# Patient Record
Sex: FEMALE | Race: WHITE | Employment: FULL TIME | URBAN - METROPOLITAN AREA
[De-identification: names, ages, dates, MRNs, and addresses within clinical notes are randomized per-mention and may not be internally consistent; named-entity substitution may affect disease eponyms.]

---

## 2020-01-09 LAB
ABO, EXTERNAL RESULT: NORMAL
HEP B, EXTERNAL RESULT: NEGATIVE
HIV, EXTERNAL RESULT: NON REACTIVE
RH FACTOR, EXTERNAL RESULT: POSITIVE
RPR, EXTERNAL RESULT: NON REACTIVE
RUBELLA TITER, EXTERNAL RESULT: NORMAL

## 2020-02-08 LAB
C. TRACHOMATIS, EXTERNAL RESULT: NEGATIVE
N. GONORRHOEAE, EXTERNAL RESULT: NEGATIVE

## 2020-07-24 LAB — GBS, EXTERNAL RESULT: NEGATIVE

## 2020-08-06 ENCOUNTER — ANESTHESIA EVENT (OUTPATIENT)
Dept: LABOR AND DELIVERY | Age: 25
End: 2020-08-06
Payer: COMMERCIAL

## 2020-08-06 ENCOUNTER — ANESTHESIA (OUTPATIENT)
Dept: LABOR AND DELIVERY | Age: 25
End: 2020-08-06
Payer: COMMERCIAL

## 2020-08-06 ENCOUNTER — HOSPITAL ENCOUNTER (INPATIENT)
Age: 25
LOS: 2 days | Discharge: HOME | End: 2020-08-08
Attending: OBSTETRICS & GYNECOLOGY | Admitting: OBSTETRICS & GYNECOLOGY
Payer: COMMERCIAL

## 2020-08-06 VITALS — SYSTOLIC BLOOD PRESSURE: 112 MMHG | DIASTOLIC BLOOD PRESSURE: 76 MMHG | OXYGEN SATURATION: 100 %

## 2020-08-06 PROBLEM — O99.213 OBESITY AFFECTING PREGNANCY IN THIRD TRIMESTER: Status: ACTIVE | Noted: 2020-08-06

## 2020-08-06 PROBLEM — Z98.891 S/P CESAREAN SECTION: Status: ACTIVE | Noted: 2020-08-06

## 2020-08-06 PROBLEM — O12.13: Status: ACTIVE | Noted: 2020-08-06

## 2020-08-06 LAB
ABO/RH: NORMAL
AMPHETAMINE SCREEN, URINE: NORMAL
ANTIBODY SCREEN: NORMAL
BARBITURATE SCREEN URINE: NORMAL
BENZODIAZEPINE SCREEN, URINE: NORMAL
BUPRENORPHINE URINE: NORMAL
CANNABINOID SCREEN URINE: NORMAL
COCAINE METABOLITE SCREEN URINE: NORMAL
HCT VFR BLD CALC: 35.2 % (ref 36–48)
HEMOGLOBIN: 11.2 G/DL (ref 12–16)
Lab: NORMAL
MCH RBC QN AUTO: 22.5 PG (ref 26–34)
MCHC RBC AUTO-ENTMCNC: 31.7 G/DL (ref 31–36)
MCV RBC AUTO: 71 FL (ref 80–100)
METHADONE SCREEN, URINE: NORMAL
OPIATE SCREEN URINE: NORMAL
OXYCODONE URINE: NORMAL
PDW BLD-RTO: 16.5 % (ref 12.4–15.4)
PH UA: 6
PHENCYCLIDINE SCREEN URINE: NORMAL
PLATELET # BLD: 316 K/UL (ref 135–450)
PMV BLD AUTO: 8.6 FL (ref 5–10.5)
PROPOXYPHENE SCREEN: NORMAL
RBC # BLD: 4.96 M/UL (ref 4–5.2)
SARS-COV-2, NAAT: NOT DETECTED
TOTAL SYPHILLIS IGG/IGM: NORMAL
WBC # BLD: 12 K/UL (ref 4–11)

## 2020-08-06 PROCEDURE — 2500000003 HC RX 250 WO HCPCS: Performed by: NURSE ANESTHETIST, CERTIFIED REGISTERED

## 2020-08-06 PROCEDURE — 3609079900 HC CESAREAN SECTION: Performed by: OBSTETRICS & GYNECOLOGY

## 2020-08-06 PROCEDURE — U0002 COVID-19 LAB TEST NON-CDC: HCPCS

## 2020-08-06 PROCEDURE — 6370000000 HC RX 637 (ALT 250 FOR IP): Performed by: OBSTETRICS & GYNECOLOGY

## 2020-08-06 PROCEDURE — 2709999900 HC NON-CHARGEABLE SUPPLY: Performed by: OBSTETRICS & GYNECOLOGY

## 2020-08-06 PROCEDURE — 86780 TREPONEMA PALLIDUM: CPT

## 2020-08-06 PROCEDURE — 2580000003 HC RX 258: Performed by: OBSTETRICS & GYNECOLOGY

## 2020-08-06 PROCEDURE — 86850 RBC ANTIBODY SCREEN: CPT

## 2020-08-06 PROCEDURE — 6360000002 HC RX W HCPCS: Performed by: OBSTETRICS & GYNECOLOGY

## 2020-08-06 PROCEDURE — 86901 BLOOD TYPING SEROLOGIC RH(D): CPT

## 2020-08-06 PROCEDURE — 3700000001 HC ADD 15 MINUTES (ANESTHESIA): Performed by: OBSTETRICS & GYNECOLOGY

## 2020-08-06 PROCEDURE — 86900 BLOOD TYPING SEROLOGIC ABO: CPT

## 2020-08-06 PROCEDURE — 7100000001 HC PACU RECOVERY - ADDTL 15 MIN: Performed by: OBSTETRICS & GYNECOLOGY

## 2020-08-06 PROCEDURE — 80307 DRUG TEST PRSMV CHEM ANLYZR: CPT

## 2020-08-06 PROCEDURE — 1220000000 HC SEMI PRIVATE OB R&B

## 2020-08-06 PROCEDURE — 7100000000 HC PACU RECOVERY - FIRST 15 MIN: Performed by: OBSTETRICS & GYNECOLOGY

## 2020-08-06 PROCEDURE — 85027 COMPLETE CBC AUTOMATED: CPT

## 2020-08-06 PROCEDURE — 6360000002 HC RX W HCPCS: Performed by: NURSE ANESTHETIST, CERTIFIED REGISTERED

## 2020-08-06 PROCEDURE — 3700000000 HC ANESTHESIA ATTENDED CARE: Performed by: OBSTETRICS & GYNECOLOGY

## 2020-08-06 RX ORDER — SODIUM CHLORIDE 0.9 % (FLUSH) 0.9 %
10 SYRINGE (ML) INJECTION PRN
Status: DISCONTINUED | OUTPATIENT
Start: 2020-08-06 | End: 2020-08-08 | Stop reason: HOSPADM

## 2020-08-06 RX ORDER — BUPIVACAINE HYDROCHLORIDE 7.5 MG/ML
INJECTION, SOLUTION INTRASPINAL PRN
Status: DISCONTINUED | OUTPATIENT
Start: 2020-08-06 | End: 2020-08-06 | Stop reason: SDUPTHER

## 2020-08-06 RX ORDER — ONDANSETRON 2 MG/ML
INJECTION INTRAMUSCULAR; INTRAVENOUS
Status: COMPLETED
Start: 2020-08-06 | End: 2020-08-06

## 2020-08-06 RX ORDER — MORPHINE SULFATE 1 MG/ML
INJECTION, SOLUTION EPIDURAL; INTRATHECAL; INTRAVENOUS
Status: DISCONTINUED
Start: 2020-08-06 | End: 2020-08-06

## 2020-08-06 RX ORDER — ACETAMINOPHEN 325 MG/1
650 TABLET ORAL EVERY 8 HOURS SCHEDULED
Status: DISCONTINUED | OUTPATIENT
Start: 2020-08-06 | End: 2020-08-08 | Stop reason: HOSPADM

## 2020-08-06 RX ORDER — PRENATAL WITH FERROUS FUM AND FOLIC ACID 3080; 920; 120; 400; 22; 1.84; 3; 20; 10; 1; 12; 200; 27; 25; 2 [IU]/1; [IU]/1; MG/1; [IU]/1; MG/1; MG/1; MG/1; MG/1; MG/1; MG/1; UG/1; MG/1; MG/1; MG/1; MG/1
1 TABLET ORAL DAILY
Status: DISCONTINUED | OUTPATIENT
Start: 2020-08-06 | End: 2020-08-08 | Stop reason: HOSPADM

## 2020-08-06 RX ORDER — DIPHENHYDRAMINE HYDROCHLORIDE 50 MG/ML
25 INJECTION INTRAMUSCULAR; INTRAVENOUS EVERY 6 HOURS PRN
Status: DISCONTINUED | OUTPATIENT
Start: 2020-08-06 | End: 2020-08-08 | Stop reason: HOSPADM

## 2020-08-06 RX ORDER — METHYLERGONOVINE MALEATE 0.2 MG/ML
200 INJECTION INTRAVENOUS PRN
Status: DISCONTINUED | OUTPATIENT
Start: 2020-08-06 | End: 2020-08-08 | Stop reason: HOSPADM

## 2020-08-06 RX ORDER — NALOXONE HYDROCHLORIDE 0.4 MG/ML
0.4 INJECTION, SOLUTION INTRAMUSCULAR; INTRAVENOUS; SUBCUTANEOUS PRN
Status: DISCONTINUED | OUTPATIENT
Start: 2020-08-06 | End: 2020-08-08 | Stop reason: HOSPADM

## 2020-08-06 RX ORDER — SODIUM CHLORIDE 0.9 % (FLUSH) 0.9 %
10 SYRINGE (ML) INJECTION EVERY 12 HOURS SCHEDULED
Status: DISCONTINUED | OUTPATIENT
Start: 2020-08-06 | End: 2020-08-08 | Stop reason: HOSPADM

## 2020-08-06 RX ORDER — KETOROLAC TROMETHAMINE 30 MG/ML
30 INJECTION, SOLUTION INTRAMUSCULAR; INTRAVENOUS EVERY 8 HOURS
Status: DISPENSED | OUTPATIENT
Start: 2020-08-06 | End: 2020-08-07

## 2020-08-06 RX ORDER — FENTANYL CITRATE 50 UG/ML
INJECTION, SOLUTION INTRAMUSCULAR; INTRAVENOUS
Status: COMPLETED
Start: 2020-08-06 | End: 2020-08-06

## 2020-08-06 RX ORDER — IBUPROFEN 800 MG/1
800 TABLET ORAL EVERY 8 HOURS SCHEDULED
Status: DISCONTINUED | OUTPATIENT
Start: 2020-08-06 | End: 2020-08-08 | Stop reason: HOSPADM

## 2020-08-06 RX ORDER — FERROUS SULFATE 325(65) MG
325 TABLET ORAL 2 TIMES DAILY WITH MEALS
Status: DISCONTINUED | OUTPATIENT
Start: 2020-08-06 | End: 2020-08-08 | Stop reason: HOSPADM

## 2020-08-06 RX ORDER — CALCIUM CARBONATE 200(500)MG
500 TABLET,CHEWABLE ORAL 3 TIMES DAILY PRN
Status: DISCONTINUED | OUTPATIENT
Start: 2020-08-06 | End: 2020-08-08 | Stop reason: HOSPADM

## 2020-08-06 RX ORDER — OXYCODONE HYDROCHLORIDE 5 MG/1
5 TABLET ORAL EVERY 4 HOURS PRN
Status: DISCONTINUED | OUTPATIENT
Start: 2020-08-06 | End: 2020-08-08 | Stop reason: HOSPADM

## 2020-08-06 RX ORDER — MISOPROSTOL 100 UG/1
800 TABLET ORAL PRN
Status: COMPLETED | OUTPATIENT
Start: 2020-08-06 | End: 2020-08-06

## 2020-08-06 RX ORDER — SODIUM CHLORIDE, SODIUM LACTATE, POTASSIUM CHLORIDE, CALCIUM CHLORIDE 600; 310; 30; 20 MG/100ML; MG/100ML; MG/100ML; MG/100ML
INJECTION, SOLUTION INTRAVENOUS CONTINUOUS
Status: DISCONTINUED | OUTPATIENT
Start: 2020-08-06 | End: 2020-08-08 | Stop reason: HOSPADM

## 2020-08-06 RX ORDER — SODIUM CHLORIDE, SODIUM LACTATE, POTASSIUM CHLORIDE, CALCIUM CHLORIDE 600; 310; 30; 20 MG/100ML; MG/100ML; MG/100ML; MG/100ML
INJECTION, SOLUTION INTRAVENOUS CONTINUOUS
Status: DISCONTINUED | OUTPATIENT
Start: 2020-08-06 | End: 2020-08-06

## 2020-08-06 RX ORDER — FENTANYL CITRATE 50 UG/ML
INJECTION, SOLUTION INTRAMUSCULAR; INTRAVENOUS
Status: DISCONTINUED
Start: 2020-08-06 | End: 2020-08-06

## 2020-08-06 RX ORDER — ONDANSETRON 8 MG/1
8 TABLET, ORALLY DISINTEGRATING ORAL EVERY 8 HOURS PRN
Status: DISCONTINUED | OUTPATIENT
Start: 2020-08-06 | End: 2020-08-08 | Stop reason: HOSPADM

## 2020-08-06 RX ORDER — DEXAMETHASONE SODIUM PHOSPHATE 10 MG/ML
INJECTION INTRAMUSCULAR; INTRAVENOUS PRN
Status: DISCONTINUED | OUTPATIENT
Start: 2020-08-06 | End: 2020-08-06 | Stop reason: SDUPTHER

## 2020-08-06 RX ORDER — METOCLOPRAMIDE HYDROCHLORIDE 5 MG/ML
INJECTION INTRAMUSCULAR; INTRAVENOUS
Status: COMPLETED
Start: 2020-08-06 | End: 2020-08-06

## 2020-08-06 RX ORDER — OXYTOCIN 10 [USP'U]/ML
INJECTION, SOLUTION INTRAMUSCULAR; INTRAVENOUS PRN
Status: DISCONTINUED | OUTPATIENT
Start: 2020-08-06 | End: 2020-08-06 | Stop reason: SDUPTHER

## 2020-08-06 RX ORDER — FENTANYL CITRATE 50 UG/ML
INJECTION, SOLUTION INTRAMUSCULAR; INTRAVENOUS PRN
Status: DISCONTINUED | OUTPATIENT
Start: 2020-08-06 | End: 2020-08-06 | Stop reason: SDUPTHER

## 2020-08-06 RX ORDER — ONDANSETRON 2 MG/ML
4 INJECTION INTRAMUSCULAR; INTRAVENOUS EVERY 6 HOURS PRN
Status: DISCONTINUED | OUTPATIENT
Start: 2020-08-06 | End: 2020-08-06

## 2020-08-06 RX ORDER — TRISODIUM CITRATE DIHYDRATE AND CITRIC ACID MONOHYDRATE 500; 334 MG/5ML; MG/5ML
SOLUTION ORAL
Status: DISCONTINUED
Start: 2020-08-06 | End: 2020-08-06 | Stop reason: WASHOUT

## 2020-08-06 RX ORDER — OXYTOCIN 10 [USP'U]/ML
INJECTION, SOLUTION INTRAMUSCULAR; INTRAVENOUS
Status: COMPLETED
Start: 2020-08-06 | End: 2020-08-06

## 2020-08-06 RX ORDER — TRISODIUM CITRATE DIHYDRATE AND CITRIC ACID MONOHYDRATE 500; 334 MG/5ML; MG/5ML
30 SOLUTION ORAL ONCE
Status: DISCONTINUED | OUTPATIENT
Start: 2020-08-06 | End: 2020-08-06

## 2020-08-06 RX ORDER — MORPHINE SULFATE 10 MG/ML
INJECTION, SOLUTION INTRAMUSCULAR; INTRAVENOUS PRN
Status: DISCONTINUED | OUTPATIENT
Start: 2020-08-06 | End: 2020-08-06 | Stop reason: SDUPTHER

## 2020-08-06 RX ORDER — DOCUSATE SODIUM 100 MG/1
100 CAPSULE, LIQUID FILLED ORAL 2 TIMES DAILY
Status: DISCONTINUED | OUTPATIENT
Start: 2020-08-06 | End: 2020-08-08 | Stop reason: HOSPADM

## 2020-08-06 RX ORDER — ASPIRIN 81 MG/1
81 TABLET, CHEWABLE ORAL DAILY
Status: ON HOLD | COMMUNITY
End: 2020-08-08 | Stop reason: HOSPADM

## 2020-08-06 RX ORDER — SODIUM CHLORIDE 0.9 % (FLUSH) 0.9 %
10 SYRINGE (ML) INJECTION PRN
Status: DISCONTINUED | OUTPATIENT
Start: 2020-08-06 | End: 2020-08-06

## 2020-08-06 RX ORDER — LANOLIN 100 %
OINTMENT (GRAM) TOPICAL
Status: DISCONTINUED | OUTPATIENT
Start: 2020-08-06 | End: 2020-08-08 | Stop reason: HOSPADM

## 2020-08-06 RX ORDER — CARBOPROST TROMETHAMINE 250 UG/ML
250 INJECTION, SOLUTION INTRAMUSCULAR PRN
Status: DISCONTINUED | OUTPATIENT
Start: 2020-08-06 | End: 2020-08-08 | Stop reason: HOSPADM

## 2020-08-06 RX ORDER — OXYCODONE HYDROCHLORIDE 5 MG/1
10 TABLET ORAL EVERY 4 HOURS PRN
Status: DISCONTINUED | OUTPATIENT
Start: 2020-08-06 | End: 2020-08-08 | Stop reason: HOSPADM

## 2020-08-06 RX ORDER — SODIUM CHLORIDE 0.9 % (FLUSH) 0.9 %
10 SYRINGE (ML) INJECTION EVERY 12 HOURS SCHEDULED
Status: DISCONTINUED | OUTPATIENT
Start: 2020-08-06 | End: 2020-08-06

## 2020-08-06 RX ORDER — SODIUM CHLORIDE, SODIUM LACTATE, POTASSIUM CHLORIDE, AND CALCIUM CHLORIDE .6; .31; .03; .02 G/100ML; G/100ML; G/100ML; G/100ML
1000 INJECTION, SOLUTION INTRAVENOUS ONCE
Status: COMPLETED | OUTPATIENT
Start: 2020-08-06 | End: 2020-08-06

## 2020-08-06 RX ORDER — ONDANSETRON 2 MG/ML
4 INJECTION INTRAMUSCULAR; INTRAVENOUS EVERY 6 HOURS PRN
Status: DISCONTINUED | OUTPATIENT
Start: 2020-08-06 | End: 2020-08-08 | Stop reason: HOSPADM

## 2020-08-06 RX ORDER — ACETAMINOPHEN 325 MG/1
975 TABLET ORAL ONCE
Status: COMPLETED | OUTPATIENT
Start: 2020-08-06 | End: 2020-08-06

## 2020-08-06 RX ORDER — METOCLOPRAMIDE HYDROCHLORIDE 5 MG/ML
10 INJECTION INTRAMUSCULAR; INTRAVENOUS ONCE
Status: COMPLETED | OUTPATIENT
Start: 2020-08-06 | End: 2020-08-06

## 2020-08-06 RX ADMIN — SODIUM CHLORIDE, POTASSIUM CHLORIDE, SODIUM LACTATE AND CALCIUM CHLORIDE 1000 ML: 600; 310; 30; 20 INJECTION, SOLUTION INTRAVENOUS at 03:45

## 2020-08-06 RX ADMIN — ACETAMINOPHEN 975 MG: 325 TABLET ORAL at 21:05

## 2020-08-06 RX ADMIN — SODIUM CHLORIDE, POTASSIUM CHLORIDE, SODIUM LACTATE AND CALCIUM CHLORIDE: 600; 310; 30; 20 INJECTION, SOLUTION INTRAVENOUS at 04:58

## 2020-08-06 RX ADMIN — ENOXAPARIN SODIUM 40 MG: 40 INJECTION SUBCUTANEOUS at 19:27

## 2020-08-06 RX ADMIN — FAMOTIDINE 20 MG: 10 INJECTION, SOLUTION INTRAVENOUS at 04:10

## 2020-08-06 RX ADMIN — SODIUM CHLORIDE, POTASSIUM CHLORIDE, SODIUM LACTATE AND CALCIUM CHLORIDE: 600; 310; 30; 20 INJECTION, SOLUTION INTRAVENOUS at 04:09

## 2020-08-06 RX ADMIN — OXYTOCIN 5 UNITS: 10 INJECTION INTRAVENOUS at 04:54

## 2020-08-06 RX ADMIN — ONDANSETRON 4 MG: 2 INJECTION INTRAMUSCULAR; INTRAVENOUS at 04:40

## 2020-08-06 RX ADMIN — OXYTOCIN 20 UNITS: 10 INJECTION INTRAVENOUS at 05:00

## 2020-08-06 RX ADMIN — DEXAMETHASONE SODIUM PHOSPHATE 10 MG: 10 INJECTION INTRAMUSCULAR; INTRAVENOUS at 04:48

## 2020-08-06 RX ADMIN — DOCUSATE SODIUM 100 MG: 100 CAPSULE, LIQUID FILLED ORAL at 20:57

## 2020-08-06 RX ADMIN — KETOROLAC TROMETHAMINE 30 MG: 30 INJECTION, SOLUTION INTRAMUSCULAR at 15:17

## 2020-08-06 RX ADMIN — KETOROLAC TROMETHAMINE 30 MG: 30 INJECTION, SOLUTION INTRAMUSCULAR at 06:25

## 2020-08-06 RX ADMIN — CEFAZOLIN SODIUM 3 G: 10 INJECTION, POWDER, FOR SOLUTION INTRAVENOUS at 04:25

## 2020-08-06 RX ADMIN — Medication 10 ML: at 20:57

## 2020-08-06 RX ADMIN — SODIUM CHLORIDE, POTASSIUM CHLORIDE, SODIUM LACTATE AND CALCIUM CHLORIDE: 600; 310; 30; 20 INJECTION, SOLUTION INTRAVENOUS at 10:15

## 2020-08-06 RX ADMIN — ANTACID TABLETS 500 MG: 500 TABLET, CHEWABLE ORAL at 23:51

## 2020-08-06 RX ADMIN — AZITHROMYCIN MONOHYDRATE 500 MG: 500 INJECTION, POWDER, LYOPHILIZED, FOR SOLUTION INTRAVENOUS at 04:38

## 2020-08-06 RX ADMIN — MISOPROSTOL 800 MCG: 100 TABLET ORAL at 07:20

## 2020-08-06 RX ADMIN — BUPIVACAINE HYDROCHLORIDE 1.6 ML: 7.5 INJECTION, SOLUTION SUBARACHNOID at 04:28

## 2020-08-06 RX ADMIN — FENTANYL CITRATE 15 MCG: 50 INJECTION INTRAMUSCULAR; INTRAVENOUS at 04:28

## 2020-08-06 RX ADMIN — METOCLOPRAMIDE HYDROCHLORIDE 10 MG: 5 INJECTION INTRAMUSCULAR; INTRAVENOUS at 04:15

## 2020-08-06 RX ADMIN — IBUPROFEN 800 MG: 800 TABLET, FILM COATED ORAL at 23:27

## 2020-08-06 RX ADMIN — DOCUSATE SODIUM 100 MG: 100 CAPSULE, LIQUID FILLED ORAL at 10:14

## 2020-08-06 RX ADMIN — MORPHINE SULFATE 0.2 MG: 10 INJECTION, SOLUTION INTRAMUSCULAR; INTRAVENOUS at 04:28

## 2020-08-06 SDOH — HEALTH STABILITY: MENTAL HEALTH: HOW OFTEN DO YOU HAVE A DRINK CONTAINING ALCOHOL?: NEVER

## 2020-08-06 ASSESSMENT — PULMONARY FUNCTION TESTS
PIF_VALUE: 0

## 2020-08-06 ASSESSMENT — PAIN SCALES - GENERAL
PAINLEVEL_OUTOF10: 3
PAINLEVEL_OUTOF10: 3
PAINLEVEL_OUTOF10: 4
PAINLEVEL_OUTOF10: 0

## 2020-08-06 NOTE — PLAN OF CARE
Problem: Discharge Planning:  Goal: Discharged to appropriate level of care  Description: Discharged to appropriate level of care  8/6/2020 1621 by Sabrina Rush RN  Outcome: Ongoing  8/6/2020 0807 by Oli Ramirez RN  Outcome: Ongoing     Problem: Fluid Volume - Imbalance:  Goal: Absence of postpartum hemorrhage signs and symptoms  Description: Absence of postpartum hemorrhage signs and symptoms  8/6/2020 1621 by Sabrina Rush RN  Outcome: Ongoing  8/6/2020 0807 by Oli Ramirez RN  Outcome: Ongoing  Goal: Absence of imbalanced fluid volume signs and symptoms  Description: Absence of imbalanced fluid volume signs and symptoms  8/6/2020 1621 by Sabrina Rush RN  Outcome: Ongoing  8/6/2020 0807 by Oli Ramirez RN  Outcome: Ongoing     Problem: Infection - Surgical Site:  Goal: Will show no infection signs and symptoms  Description: Will show no infection signs and symptoms  8/6/2020 1621 by Sabrina Rush RN  Outcome: Ongoing  8/6/2020 0807 by Oli Ramirez RN  Outcome: Ongoing     Problem: Mood - Altered:  Goal: Mood stable  Description: Mood stable  8/6/2020 1621 by Sabrina Rush RN  Outcome: Ongoing  8/6/2020 0807 by Oli Ramirez RN  Outcome: Ongoing     Problem: Nausea/Vomiting:  Goal: Absence of nausea/vomiting  Description: Absence of nausea/vomiting  8/6/2020 1621 by Sabrina Rush RN  Outcome: Ongoing  8/6/2020 0807 by Oli Ramirez RN  Outcome: Ongoing     Problem: Pain - Acute:  Goal: Pain level will decrease  Description: Pain level will decrease  8/6/2020 1621 by Sabrina Rush RN  Outcome: Ongoing  8/6/2020 0807 by Oli Ramirez RN  Outcome: Ongoing     Problem: Urinary Retention:  Goal: Urinary elimination within specified parameters  Description: Urinary elimination within specified parameters  8/6/2020 1621 by Sabrina Rush RN  Outcome: Ongoing  8/6/2020 0807 by Oli Ramirez RN  Outcome: Ongoing     Problem: Venous symptoms of venous thromboembolism  8/6/2020 0801 by Marybel Dempsey, SARA  Outcome: Completed  8/6/2020 0343 by Marybel Dempsey RN  Outcome: Ongoing

## 2020-08-06 NOTE — PLAN OF CARE
Problem: Pain:  Goal: Pain level will decrease  Description: Pain level will decrease  8/6/2020 0801 by Prudy Shone, RN  Outcome: Completed  8/6/2020 0343 by Prudy Shone, RN  Outcome: Ongoing  Goal: Control of acute pain  Description: Control of acute pain  8/6/2020 0801 by Prudy Shone, RN  Outcome: Completed  8/6/2020 0343 by Prudy Shone, RN  Outcome: Ongoing  Goal: Control of chronic pain  Description: Control of chronic pain  8/6/2020 0801 by Prudy Shone, RN  Outcome: Completed  8/6/2020 0343 by Prudy Shone, RN  Outcome: Ongoing     Problem: Anxiety:  Goal: Level of anxiety will decrease  Description: Level of anxiety will decrease  8/6/2020 0801 by Prudy Shone, RN  Outcome: Completed  8/6/2020 0343 by Prudy Shone, RN  Outcome: Ongoing     Problem: Aspiration - Risk of:  Goal: Absence of aspiration  Description: Absence of aspiration  8/6/2020 0801 by Prudy Shone, RN  Outcome: Completed  8/6/2020 0343 by Prudy Shone, RN  Outcome: Ongoing     Problem: Tissue Perfusion - Uteroplacental, Altered:  Goal: Absence of abnormal fetal heart rate pattern  Description: Absence of abnormal fetal heart rate pattern  8/6/2020 0801 by Prudy Shone, RN  Outcome: Completed  8/6/2020 0343 by Prudy Shone, RN  Outcome: Ongoing     Problem: Venous Thromboembolism - Risk of:  Goal: Will show no signs or symptoms of venous thromboembolism  Description: Will show no signs or symptoms of venous thromboembolism  8/6/2020 0801 by Prudy Shone, RN  Outcome: Completed  8/6/2020 0343 by Prudy Shone, RN  Outcome: Ongoing

## 2020-08-06 NOTE — PROGRESS NOTES
Dr. Juliane Pierre notified of pt's 1st hour QBL of 90ml and moderate rubra. Order received for rectal Cytotec.

## 2020-08-06 NOTE — PLAN OF CARE
Jaqueline Arizmendi RN  Outcome: Ongoing  Goal: Control of chronic pain  Description: Control of chronic pain  8/6/2020 0801 by Nixon Carney RN  Outcome: Completed  8/6/2020 0343 by Nixon Carney RN  Outcome: Ongoing     Problem: Anxiety:  Goal: Level of anxiety will decrease  Description: Level of anxiety will decrease  8/6/2020 0801 by Nixon Carney RN  Outcome: Completed  8/6/2020 0343 by Nixon Carney RN  Outcome: Ongoing     Problem: Aspiration - Risk of:  Goal: Absence of aspiration  Description: Absence of aspiration  8/6/2020 0801 by Nixon Carney RN  Outcome: Completed  8/6/2020 0343 by Nixon Carney RN  Outcome: Ongoing     Problem: Tissue Perfusion - Uteroplacental, Altered:  Goal: Absence of abnormal fetal heart rate pattern  Description: Absence of abnormal fetal heart rate pattern  8/6/2020 0801 by Nixon Carney RN  Outcome: Completed  8/6/2020 0343 by Nixon Carney RN  Outcome: Ongoing     Problem: Venous Thromboembolism - Risk of:  Goal: Will show no signs or symptoms of venous thromboembolism  Description: Will show no signs or symptoms of venous thromboembolism  8/6/2020 0801 by Nixon Carney RN  Outcome: Completed  8/6/2020 0343 by Nixon Carney RN  Outcome: Ongoing

## 2020-08-06 NOTE — ANESTHESIA PRE PROCEDURE
Department of Anesthesiology  Preprocedure Note       Name:  Malaika Melara   Age:  22 y.o.  :  1995                                          MRN:  [id number]         Date:  2020      Surgeon: Korin Carlson):  Juliane Montano DO    Procedure: Procedure(s):   SECTION    Breech, ruptured, darryl and dilated to 4cm    Medications prior to admission:   Prior to Admission medications    Medication Sig Start Date End Date Taking?  Authorizing Provider   Prenatal Vit-Fe Fumarate-FA (PRENATAL VITAMIN PO) Take by mouth   Yes Historical Provider, MD   aspirin 81 MG chewable tablet Take 81 mg by mouth daily   Yes Historical Provider, MD       Current medications:    Current Facility-Administered Medications   Medication Dose Route Frequency Provider Last Rate Last Dose    lactated ringers infusion   Intravenous Continuous Larance DO Charmaine 125 mL/hr at 20 0409      sodium chloride flush 0.9 % injection 10 mL  10 mL Intravenous 2 times per day Larance Charmaine, DO        sodium chloride flush 0.9 % injection 10 mL  10 mL Intravenous PRN Juliane Montano DO        citric acid-sodium citrate (BICITRA) solution 30 mL  30 mL Oral Once Juliane Montano DO        oxytocin (PITOCIN) 30 units in 500 mL infusion  1 thelma-units/min Intravenous Continuous Juliane Montano DO        ondansetron [facility]) injection 4 mg  4 mg Intravenous Q6H PRN Larjami Montano DO   4 mg at 20 0440    oxytocin (PITOCIN) 10 UNIT/ML injection             fentaNYL (SUBLIMAZE) 100 MCG/2ML injection             morphine (PF) 1 MG/ML injection             citric acid-sodium citrate (BICITRA) 500-334 MG/5ML solution              Facility-Administered Medications Ordered in Other Encounters   Medication Dose Route Frequency Provider Last Rate Last Dose    dexamethasone (DECADRON) injection    PRN Angel Me, APRN - CRNA   10 mg at 20 0448    famotidine (PEPCID) injection    PRN [place] Me, APRN - CRNA   20 mg at 08/06/20 0410    bupivacaine 0.75% in dextrose 8.25% (intrathecal) (SENSORCAINE) 0.75-8.25 % injection    PRN Silas Roman APRN - CRNA   1.6 mL at 08/06/20 0428    fentaNYL (SUBLIMAZE) injection    PRN Silas Roman APRN - CRNA   15 mcg at 08/06/20 0428    morphine (PF) injection    PRN Silas Roman, APRN - CRNA   0.2 mg at 08/06/20 0557       Allergies:  No Known Allergies    Problem List:    Patient Active Problem List   Diagnosis Code    Breech presentation of fetus O32. 1XX0    Obesity affecting pregnancy in third trimester O99.213    Gestational proteinuria, third trimester O12.13       Past Medical History:        Diagnosis Date    Hypertension     Gestational HTN with 1st pregnancy    Thyroid disease     thyroid underactive prior to pregnancy but labs normal during pregnancy       Past Surgical History:  History reviewed. No pertinent surgical history. Social History:    Social History     Tobacco Use    Smoking status: Never Smoker    Smokeless tobacco: Never Used   Substance Use Topics    Alcohol use: Never     Frequency: Never                                Counseling given: Not Answered      Vital Signs (Current):   Vitals:    08/06/20 0300   BP: 136/85   Pulse: 99   Resp: 16   Temp: 36.8 °C (98.2 °F)   TempSrc: Oral   Weight: 260 lb (117.9 kg)   Height: 5' 4\" (1.626 m)                                              BP Readings from Last 3 Encounters:   08/06/20 136/85   08/06/20 (!) 108/56       NPO Status: Time of last liquid consumption: 1930                        Time of last solid consumption: 1930                        Date of last liquid consumption: 08/05/20                        Date of last solid food consumption: 08/05/20    BMI:   Wt Readings from Last 3 Encounters:   08/06/20 260 lb (117.9 kg)     Body mass index is 44.63 kg/m².     CBC:   Lab Results   Component Value Date    WBC 12.0 08/06/2020    RBC 4.96 08/06/2020    HGB neuraxial and general anesthesia were discussed and she agrees to proceed at the direction of the care team.    Clydie Jeans, JESUS - CRNA  August 6, 2020  5:15 AM    Performed at [id number]

## 2020-08-06 NOTE — LACTATION NOTE
This note was copied from a baby's chart. Introduced self to patient as Lactation RN, name and phone number written on white board in room. Mother states feedings are going well. Mother also turned in her breast pump form and it was faxed. Mother instructed to call Lactation nurse for F/U care as needed.

## 2020-08-06 NOTE — LACTATION NOTE
This note was copied from a baby's chart. Lactation Progress Note      Data:   Multip who is breast feeding for the first time. Mob currently has baby at breast bundled and in poor position, attempting to latch. Action: Baby unbundled and placed in good position at breast. Mob expressed colostrum to encourage feed. Baby eventually began rooting and a good deep latch was achieved with SRS with stimulation and AS. Breast feeding education provided. Encouraged to allow baby to go to breast ad yajaira and stressed importance of a good deep latch. Offered LC assistance prn. Discouraged paci and bottles for the first few weeks. Encouraged good hydration and nutrition. [facility] number on board and encouraged to call for f/u prn. Response: Pleased with feed. Verbalized understanding and comfortable with breast feeding for d/c.

## 2020-08-06 NOTE — PLAN OF CARE
Problem: Pain:  Goal: Pain level will decrease  Description: Pain level will decrease  8/6/2020 0801 by Hina Vera RN  Outcome: Completed  8/6/2020 0343 by Hina Vera RN  Outcome: Ongoing  Goal: Control of acute pain  Description: Control of acute pain  8/6/2020 0801 by Hina Vera RN  Outcome: Completed  8/6/2020 0343 by Hina Vera RN  Outcome: Ongoing  Goal: Control of chronic pain  Description: Control of chronic pain  8/6/2020 0801 by Hina Vera RN  Outcome: Completed  8/6/2020 0343 by Hina Vera RN  Outcome: Ongoing     Problem: Anxiety:  Goal: Level of anxiety will decrease  Description: Level of anxiety will decrease  8/6/2020 0801 by Hina Vera RN  Outcome: Completed  8/6/2020 0343 by Hina Vera RN  Outcome: Ongoing     Problem: Aspiration - Risk of:  Goal: Absence of aspiration  Description: Absence of aspiration  8/6/2020 0801 by Hina Vera RN  Outcome: Completed  8/6/2020 0343 by Hina Vera RN  Outcome: Ongoing     Problem: Tissue Perfusion - Uteroplacental, Altered:  Goal: Absence of abnormal fetal heart rate pattern  Description: Absence of abnormal fetal heart rate pattern  8/6/2020 0801 by Hina Vera RN  Outcome: Completed  8/6/2020 0343 by Hina Vera RN  Outcome: Ongoing     Problem: Venous Thromboembolism - Risk of:  Goal: Will show no signs or symptoms of venous thromboembolism  Description: Will show no signs or symptoms of venous thromboembolism  8/6/2020 0801 by Hina Vera RN  Outcome: Completed  8/6/2020 0343 by Hina Vera RN  Outcome: Ongoing

## 2020-08-06 NOTE — L&D DELIVERY SUMMARY NOTE
subcuticular layer was closed with 3.0 vicryl. The skin was reapproximated with 4.0 monocryl. Instrument, sponge, and needle counts were correct prior the abdominal closure and at the conclusion of the case. Findings:  Normal uterus, bilateral tubes & ovaries    Intake/Output:     Date 08/05/20 0701 - 08/06/20 0700(Not Admitted) 08/06/20 0701 - 08/07/20 0700   Shift 7957-0703 8596-4782 4550-1024 24 Hour Total 9250-1495 2022-3118 4767-6066 24 Hour Total   INTAKE   Shift Total           OUTPUT   Urine   25 25       Blood   600 600         EBL (mL)   600 600       Shift Total   625 625       NET   -625 -625                Drains:  100 ml urine in marcial at end of procedure                Specimens: none           Implants: none           Complications:  none           Disposition: PACU - hemodynamically stable. Condition: infant stable to general nursery and mother stable    Attending Attestation: I was present and scrubbed for the entire procedure.

## 2020-08-06 NOTE — H&P
Department of Obstetrics and Gynecology  Attending Obstetrics History and Physical        CHIEF COMPLAINT:  leakage of amniotic fluid    HISTORY OF PRESENT ILLNESS:      The patient is a 22 y.o. y/o    @  37w6d weeks by LMP c/w 9 wk. US. Patient presents c/o SROM - clear fluid at 01:30 AM, denies VB. +FM. Reports contractions started after SROM. Pt  is being admitted for   without tubal ligation for breech presentation. Preg c/b 1) breech presentation 2) obesity 3)h/o PE w/ G1- had IOL at 36 wks. 4) 2020 Proteinuria - 539 mg    Estimated EFW - 3000g  2020 BPP 8/8 Breech,ITZEL - 21.9cm           Past Medical History:        Diagnosis Date    Hypertension     Gestational HTN with 1st pregnancy    Thyroid disease     thyroid underactive prior to pregnancy but labs normal during pregnancy     Past Surgical History:    History reviewed. No pertinent surgical history. Social History:    TOBACCO:   reports that she has never smoked. She has never used smokeless tobacco.  ETOH:   reports no history of alcohol use. DRUGS:   reports no history of drug use. MARITAL STATUS:    Family History:   History reviewed. No pertinent family history. Medications Prior to Admission:  PNV, baby ASA  Allergies: nkda          PHYSICAL EXAM:  Vitals:    20 0300   BP: 136/85   Pulse: 99   Resp: 16   Temp: 98.2 °F (36.8 °C)   TempSrc: Oral   Weight: 260 lb (117.9 kg)   Height: 5' 4\" (1.626 m)     General appearance:  awake, alert, cooperative, no apparent distress, and appears stated age  Heart:RRR  JAMES:CTA b/l  Abd:gravid, obese, NT, ND, +BS  Ext: 1+PE, no c/c    Fetal heart rate:  Baseline Heart Rate 125, Cat.  I  Cervix:    4-5 cm/70%/-2  Breech presentation   Per triage nurse      Contraction frequency:  3 minutes    Membranes:  Ruptured clear fluid    OB Labs  GBS - negative  A positive  Rubella - Immune  HIV - NR  HBsAg-neg  RPR-N/R  2020 labs  GC/CT-neg  Pap - negative    ASSESSMENT AND PLAN:  24 y/o  @ 37.6 wks. SROM, breech presentation - will proceed w/ PLTCS - R/B/A/expected outcomes d/w pt.  Questions answered & pt stated understanding  Fetus - tracing reassuring

## 2020-08-07 LAB
HCT VFR BLD CALC: 31.2 % (ref 36–48)
HEMOGLOBIN: 9.9 G/DL (ref 12–16)
MCH RBC QN AUTO: 22.8 PG (ref 26–34)
MCHC RBC AUTO-ENTMCNC: 31.7 G/DL (ref 31–36)
MCV RBC AUTO: 72.1 FL (ref 80–100)
PDW BLD-RTO: 16.3 % (ref 12.4–15.4)
PLATELET # BLD: 301 K/UL (ref 135–450)
PMV BLD AUTO: 8.8 FL (ref 5–10.5)
RBC # BLD: 4.33 M/UL (ref 4–5.2)
WBC # BLD: 10.9 K/UL (ref 4–11)

## 2020-08-07 PROCEDURE — 6370000000 HC RX 637 (ALT 250 FOR IP): Performed by: OBSTETRICS & GYNECOLOGY

## 2020-08-07 PROCEDURE — 2580000003 HC RX 258: Performed by: OBSTETRICS & GYNECOLOGY

## 2020-08-07 PROCEDURE — 6360000002 HC RX W HCPCS: Performed by: OBSTETRICS & GYNECOLOGY

## 2020-08-07 PROCEDURE — 85027 COMPLETE CBC AUTOMATED: CPT

## 2020-08-07 PROCEDURE — 36415 COLL VENOUS BLD VENIPUNCTURE: CPT

## 2020-08-07 PROCEDURE — 1220000000 HC SEMI PRIVATE OB R&B

## 2020-08-07 RX ADMIN — FERROUS SULFATE TAB 325 MG (65 MG ELEMENTAL FE) 325 MG: 325 (65 FE) TAB at 18:14

## 2020-08-07 RX ADMIN — OXYCODONE 5 MG: 5 TABLET ORAL at 20:02

## 2020-08-07 RX ADMIN — ACETAMINOPHEN 650 MG: 325 TABLET ORAL at 21:47

## 2020-08-07 RX ADMIN — ACETAMINOPHEN 650 MG: 325 TABLET ORAL at 13:46

## 2020-08-07 RX ADMIN — FERROUS SULFATE TAB 325 MG (65 MG ELEMENTAL FE) 325 MG: 325 (65 FE) TAB at 09:02

## 2020-08-07 RX ADMIN — IBUPROFEN 800 MG: 800 TABLET, FILM COATED ORAL at 18:14

## 2020-08-07 RX ADMIN — Medication 10 ML: at 20:04

## 2020-08-07 RX ADMIN — DOCUSATE SODIUM 100 MG: 100 CAPSULE, LIQUID FILLED ORAL at 09:02

## 2020-08-07 RX ADMIN — METFORMIN HYDROCHLORIDE 1 TABLET: 500 TABLET, EXTENDED RELEASE ORAL at 09:02

## 2020-08-07 RX ADMIN — IBUPROFEN 800 MG: 800 TABLET, FILM COATED ORAL at 09:02

## 2020-08-07 RX ADMIN — ENOXAPARIN SODIUM 40 MG: 40 INJECTION SUBCUTANEOUS at 09:02

## 2020-08-07 RX ADMIN — DOCUSATE SODIUM 100 MG: 100 CAPSULE, LIQUID FILLED ORAL at 20:02

## 2020-08-07 RX ADMIN — ONDANSETRON 8 MG: 8 TABLET, ORALLY DISINTEGRATING ORAL at 09:41

## 2020-08-07 RX ADMIN — ACETAMINOPHEN 650 MG: 325 TABLET ORAL at 05:17

## 2020-08-07 RX ADMIN — Medication 10 ML: at 09:02

## 2020-08-07 ASSESSMENT — PAIN SCALES - GENERAL
PAINLEVEL_OUTOF10: 4
PAINLEVEL_OUTOF10: 3
PAINLEVEL_OUTOF10: 6
PAINLEVEL_OUTOF10: 4

## 2020-08-07 NOTE — PROGRESS NOTES
Department of Obstetrics and Gynecology  Labor and Delivery  Attending Post Partum Progress     2020   [employer] Day      POD 1  positive Void,   Lochia less than menses  Pain controlled : Yes  Breastfeeding Yes  Ambulating Yes      OBJECTIVE:      Vitals:  BP (!) 118/56   Pulse 107   Temp 97.7 °F (36.5 °C) (Oral)   Resp 18   Ht 5' 4\" (1.626 m)   Wt 260 lb (117.9 kg)   SpO2 98%   Breastfeeding Unknown   BMI 44.63 kg/m² , Body mass index is 44.63 kg/m². Level of distress:None  ABDOMEN: Obese, nontender, FF below umbilicus. Inc w/o e/i/d      DATA:    CBC:   Lab Results   Component Value Date    WBC 10.9 2020    RBC 4.33 2020    HGB 9.9 2020    HCT 31.2 2020    MCV 72.1 2020    MCH 22.8 2020    MCHC 31.7 2020    RDW 16.3 2020     2020    MPV 8.8 2020         ASSESSMENT & PLAN:   POD 1  s/p primary  section for breech, doing well. Routine post-op care.     Yolanda Rich

## 2020-08-07 NOTE — LACTATION NOTE
This note was copied from a baby's chart. Lactation Progress Note      Data:   F/U on multip who is breast feeding for the first time. Baby crying in fobs arms. Mob states that baby wants to feed a lot. Action: Explained that baby needs to feed a lot to bring in mature milk. Encouraged to allow baby to go to breast ad yajaira. Offered assistance with feed. Baby unbundled and placed at breast. Baby calmed, began rooting and a good latch was achieved with SRS and AS. BF education reviewed and encouraged to call for f/u prn. Response: Verbalized and demonstrated understanding.

## 2020-08-07 NOTE — ANESTHESIA POSTPROCEDURE EVALUATION
Department of Anesthesiology  Postprocedure Note    Patient: Zara Melara  MRN: [id number]  YOB: 1995  Date of evaluation: 2020  Time:  6:59 AM     Procedure Summary     Date:  20 Room / Location:  [facility] [address]    Anesthesia Start:  3314 Anesthesia Stop:      Procedure:   SECTION (N/A Abdomen) Diagnosis:  (Breech)    Surgeon:  Roxane Maria DO Responsible Provider:  Effie Caldwell MD    Anesthesia Type:  spinal ASA Status:  2 - Emergent          Anesthesia Type: No value filed. Mehnaz Phase I: Mehnaz Score: 9    Mehnaz Phase II: Mehnaz Score: 9    Last vitals: Reviewed and per EMR flowsheets. Anesthesia Post Evaluation    Level of consciousness: awake and alert  Nausea & Vomiting: no vomiting and no nausea  Complications: no  Cardiovascular status: hemodynamically stable  Respiratory status: acceptable and room air  Hydration status: stable  Comments: POD #1 s/p  section under spinal anesthesia with duramorph. Progress notes, flow sheets, labs, and MARs reviewed. No apparent or reported anesthesia complications thus far.

## 2020-08-08 VITALS
SYSTOLIC BLOOD PRESSURE: 136 MMHG | HEART RATE: 93 BPM | WEIGHT: 260 LBS | TEMPERATURE: 98.1 F | DIASTOLIC BLOOD PRESSURE: 63 MMHG | OXYGEN SATURATION: 98 % | BODY MASS INDEX: 44.39 KG/M2 | HEIGHT: 64 IN | RESPIRATION RATE: 16 BRPM

## 2020-08-08 PROCEDURE — 6360000002 HC RX W HCPCS: Performed by: OBSTETRICS & GYNECOLOGY

## 2020-08-08 PROCEDURE — 6370000000 HC RX 637 (ALT 250 FOR IP): Performed by: OBSTETRICS & GYNECOLOGY

## 2020-08-08 RX ORDER — IBUPROFEN 800 MG/1
800 TABLET ORAL EVERY 8 HOURS SCHEDULED
Qty: 30 TABLET | Refills: 1 | Status: SHIPPED | OUTPATIENT
Start: 2020-08-08

## 2020-08-08 RX ORDER — OXYCODONE HYDROCHLORIDE 5 MG/1
5 TABLET ORAL EVERY 4 HOURS PRN
Qty: 12 TABLET | Refills: 0 | Status: SHIPPED | OUTPATIENT
Start: 2020-08-08 | End: 2020-08-11

## 2020-08-08 RX ADMIN — METFORMIN HYDROCHLORIDE 1 TABLET: 500 TABLET, EXTENDED RELEASE ORAL at 08:38

## 2020-08-08 RX ADMIN — FERROUS SULFATE TAB 325 MG (65 MG ELEMENTAL FE) 325 MG: 325 (65 FE) TAB at 08:38

## 2020-08-08 RX ADMIN — IBUPROFEN 800 MG: 800 TABLET, FILM COATED ORAL at 10:07

## 2020-08-08 RX ADMIN — ENOXAPARIN SODIUM 40 MG: 40 INJECTION SUBCUTANEOUS at 08:39

## 2020-08-08 RX ADMIN — DOCUSATE SODIUM 100 MG: 100 CAPSULE, LIQUID FILLED ORAL at 08:39

## 2020-08-08 RX ADMIN — IBUPROFEN 800 MG: 800 TABLET, FILM COATED ORAL at 02:02

## 2020-08-08 RX ADMIN — ACETAMINOPHEN 650 MG: 325 TABLET ORAL at 06:13

## 2020-08-08 ASSESSMENT — PAIN SCALES - GENERAL
PAINLEVEL_OUTOF10: 3
PAINLEVEL_OUTOF10: 2
PAINLEVEL_OUTOF10: 3

## 2020-08-08 NOTE — FLOWSHEET NOTE
Patient and FOB assessed for discharge readiness, both state that they feel well prepared to take this baby home. Instructed to have a rear facing car seat in the room prior to discharge. The birth certificate has been collected. The patient plans to schedule follow up appointment with the pediatrician for Monday/Tuesday. Liza Rubyon You Ready for Discharge? \" form completed and placed into the discharge binder. The patient states that her discomfort has been well controlled during her stay. Vaccine record has been reviewed with the patient.

## 2020-08-08 NOTE — PLAN OF CARE
Problem: Discharge Planning:  Goal: Discharged to appropriate level of care  Description: Discharged to appropriate level of care  8/8/2020 1052 by Carole Hood  Outcome: Completed  8/8/2020 0222 by Luann Ortiz RN  Outcome: Ongoing     Problem: Fluid Volume - Imbalance:  Goal: Absence of postpartum hemorrhage signs and symptoms  Description: Absence of postpartum hemorrhage signs and symptoms  8/8/2020 1052 by Carole Hood  Outcome: Completed  8/8/2020 0222 by Luann Ortiz RN  Outcome: Ongoing  Goal: Absence of imbalanced fluid volume signs and symptoms  Description: Absence of imbalanced fluid volume signs and symptoms  8/8/2020 1052 by Carole Hood  Outcome: Completed  8/8/2020 0222 by Luann Ortiz RN  Outcome: Ongoing     Problem: Infection - Surgical Site:  Goal: Will show no infection signs and symptoms  Description: Will show no infection signs and symptoms  8/8/2020 1052 by Carole Hood  Outcome: Completed  8/8/2020 0222 by Luann Ortiz RN  Outcome: Ongoing     Problem: Mood - Altered:  Goal: Mood stable  Description: Mood stable  8/8/2020 1052 by Carole Hood  Outcome: Completed  8/8/2020 0222 by Luann Ortiz RN  Outcome: Ongoing     Problem: Nausea/Vomiting:  Goal: Absence of nausea/vomiting  Description: Absence of nausea/vomiting  8/8/2020 1052 by Carole Hood  Outcome: Completed  8/8/2020 0222 by Luann Ortiz RN  Outcome: Ongoing     Problem: Pain - Acute:  Goal: Pain level will decrease  Description: Pain level will decrease  8/8/2020 1052 by Carole Hood  Outcome: Completed  8/8/2020 0222 by Luann Ortiz RN  Outcome: Ongoing     Problem: Urinary Retention:  Goal: Urinary elimination within specified parameters  Description: Urinary elimination within specified parameters  8/8/2020 1052 by Carole Hood  Outcome: Completed  8/8/2020 0222 by Luann Ortiz RN  Outcome: Ongoing     Problem: Venous Thromboembolism:  Goal: Will show no signs or symptoms of venous thromboembolism  Description: Will show no signs or symptoms of venous thromboembolism  8/8/2020 1052 by Dayton Dunlap  Outcome: Completed  8/8/2020 0222 by Beatrice Lopez RN  Outcome: Ongoing  Goal: Absence of signs or symptoms of impaired coagulation  Description: Absence of signs or symptoms of impaired coagulation  8/8/2020 1052 by Dayton Dunlap  Outcome: Completed  8/8/2020 0222 by Beatrice Lopez RN  Outcome: Ongoing

## 2020-08-08 NOTE — PROGRESS NOTES
ID bands checked. Mother's ID band and one of baby's ID bands removed and taped to discharge instruction sheet, signed by patient and witnessed by RN. Discharged in wheelchair, holding baby in car seat on lap. Patient discharged in stable condition accompanied by fob. Patient verbalizes understanding of discharge instructions and denies further questions.  Prescriptions given if. Pt seen, evaluated, and discharged per provider without nursing care. See provider notes.

## 2020-08-08 NOTE — DISCHARGE SUMMARY
Obstetric Discharge Summary    Admitting Diagnosis  IUP 37.6 weeks  OB History        2    Para   2    Term   1       1    AB   0    Living   2       SAB   0    TAB   0    Ectopic   0    Molar   0    Multiple   0    Live Births   2          Obstetric Comments   Induced at 39 weeks for gestational HTN             Reasons for Admission on 2020  2:49 AM  Breech presentation on examination, single or unspecified fetus [O32.1XX0]  No comment available  Spontaneous rupture of membranes in setting of breech malpresentation    Prenatal Procedures  None    Intrapartum Procedures        Multiple birth?: no         Delivery: See Labor and Delivery Summary       Postpartum Procedures  None    Postpartum/Operative Complications        Data  Information for the patient's :  Day, Baby Girl Veto Hart [[id number]]   female   Birth Weight: 7 lb 0.4 oz (3.185 kg)     Discharge With Mother  Complications: No    Discharge Diagnosis       Discharge Information  Current Discharge Medication List      START taking these medications    Details   ibuprofen (ADVIL;MOTRIN) 800 MG tablet Take 1 tablet by mouth every 8 hours  Qty: 30 tablet, Refills: 1      oxyCODONE (ROXICODONE) 5 MG immediate release tablet Take 1 tablet by mouth every 4 hours as needed for Pain for up to 3 days.   Qty: 12 tablet, Refills: 0    Comments: Reduce doses taken as pain becomes manageable  Associated Diagnoses: S/P  section         CONTINUE these medications which have NOT CHANGED    Details   Prenatal Vit-Fe Fumarate-FA (PRENATAL VITAMIN PO) Take by mouth         STOP taking these medications       aspirin 81 MG chewable tablet Comments:   Reason for Stopping:           OBGYN Attending Progress Note  POD#2 s/p PLTCD 2/2 SROM in setting of breech malpresentation  EBL 600mL    S: No complaints, kirill po, pain controlled by meds, + ambulation, +flatus/+BM, lochia less than menses    O: /63   Pulse 93   Temp 98.1 °F (36.7 °C) (Oral)   Resp 16   Ht 5' 4\" (1.626 m)   Wt 260 lb (117.9 kg)   SpO2 98%   Breastfeeding Unknown   BMI 44.63 kg/m²   Abd - soft, fundus firm below umbilicus, incision c/d/i w dermabond, healing well  Ext warm well perfused    WBC/Hgb/Hct/Plts:  10.9/9.9/31.2/301 (08/07 0700)    A/P: POD #2 PLTCD 2/2 SROM in setting of breech malpresentation  1. Recovering well  2. Meeting postpartum milestones. 3. Pelvic rest x 6wk, f/u up in office in 2wk for incision check and 6wk for routine pp visit. 4. Rx for Ibuprofen, oxycodone provided prn mod to severe pain  5. Discharge home today. No discharge procedures on file. Discharge to: Home  Follow up in 2 weeks at [facility] [place].     Discharge Date: 8/8/20 Time: 1100      Comments

## 2022-04-24 ENCOUNTER — HOSPITAL ENCOUNTER (EMERGENCY)
Age: 27
Discharge: HOME | End: 2022-04-24
Payer: MEDICAID

## 2022-04-24 ENCOUNTER — APPOINTMENT (OUTPATIENT)
Dept: GENERAL RADIOLOGY | Age: 27
End: 2022-04-24
Payer: MEDICAID

## 2022-04-24 VITALS
BODY MASS INDEX: 40.97 KG/M2 | HEART RATE: 89 BPM | OXYGEN SATURATION: 99 % | TEMPERATURE: 98.4 F | DIASTOLIC BLOOD PRESSURE: 78 MMHG | RESPIRATION RATE: 18 BRPM | HEIGHT: 64 IN | WEIGHT: 240 LBS | SYSTOLIC BLOOD PRESSURE: 160 MMHG

## 2022-04-24 DIAGNOSIS — S93.402A SPRAIN OF LEFT ANKLE, UNSPECIFIED LIGAMENT, INITIAL ENCOUNTER: Primary | ICD-10-CM

## 2022-04-24 PROCEDURE — 99283 EMERGENCY DEPT VISIT LOW MDM: CPT

## 2022-04-24 PROCEDURE — 6370000000 HC RX 637 (ALT 250 FOR IP): Performed by: NURSE PRACTITIONER

## 2022-04-24 PROCEDURE — 73610 X-RAY EXAM OF ANKLE: CPT

## 2022-04-24 RX ORDER — NAPROXEN 250 MG/1
250 TABLET ORAL ONCE
Status: COMPLETED | OUTPATIENT
Start: 2022-04-24 | End: 2022-04-24

## 2022-04-24 RX ORDER — NAPROXEN 250 MG/1
250 TABLET ORAL 2 TIMES DAILY WITH MEALS
Qty: 20 TABLET | Refills: 1 | Status: SHIPPED | OUTPATIENT
Start: 2022-04-24

## 2022-04-24 RX ADMIN — NAPROXEN 250 MG: 250 TABLET ORAL at 19:55

## 2022-04-24 ASSESSMENT — ENCOUNTER SYMPTOMS
SHORTNESS OF BREATH: 0
RHINORRHEA: 0
COLOR CHANGE: 0
FACIAL SWELLING: 0
SORE THROAT: 0
ABDOMINAL PAIN: 0

## 2022-04-24 ASSESSMENT — PAIN DESCRIPTION - LOCATION: LOCATION: ANKLE

## 2022-04-24 ASSESSMENT — PAIN SCALES - GENERAL
PAINLEVEL_OUTOF10: 1
PAINLEVEL_OUTOF10: 2

## 2022-04-24 ASSESSMENT — PAIN DESCRIPTION - ORIENTATION
ORIENTATION: LEFT
ORIENTATION: LEFT

## 2022-04-24 ASSESSMENT — PAIN DESCRIPTION - DESCRIPTORS
DESCRIPTORS: ACHING
DESCRIPTORS: ACHING

## 2022-04-24 ASSESSMENT — PAIN - FUNCTIONAL ASSESSMENT: PAIN_FUNCTIONAL_ASSESSMENT: 0-10

## 2022-04-24 ASSESSMENT — PAIN DESCRIPTION - FREQUENCY: FREQUENCY: CONTINUOUS

## 2022-04-24 NOTE — ED PROVIDER NOTES
Evaluated by [id number] [facility] Provider          [address] [facility]  EMERGENCY DEPARTMENT ENCOUNTER        Pt Name: Mary Levin  MRN: [id number]  Mat[birth year]o 1995  Dateof evaluation: 4/24/2022  Provider: JESUS Kramer CNP  PCP: No primary care provider on file. ED Attending: No att. providers found    CHIEF COMPLAINT       Chief Complaint   Patient presents with    Ankle Injury      son fell on left ankle Wed; swelling and pain since        HISTORY OF PRESENTILLNESS   (Location/Symptom, Timing/Onset, Context/Setting, Quality, Duration, Modifying Factors, Severity)  Note limiting factors. Abril Melara is a 23 y.o. female for left ankle pain. Onset was 4 days ago. Context includes pt states son fell on her left ankle 4 days ago and she is continuing to have pain. Alleviating factors include nothing. Aggravating factors include nothing. Pain is 2/10. Tylenol and motrin has been used for pain today. Nursing Notes were all reviewed and agreed with or any disagreements were addressed  in the HPI. REVIEW OF SYSTEMS    (2-9 systems for level 4, 10 or more for level 5)     Review of Systems   Constitutional: Negative for activity change, appetite change and fever. HENT: Negative for congestion, facial swelling, rhinorrhea and sore throat. Eyes: Negative for visual disturbance. Respiratory: Negative for shortness of breath. Cardiovascular: Negative for chest pain. Gastrointestinal: Negative for abdominal pain. Genitourinary: Negative for difficulty urinating. Musculoskeletal: Negative for arthralgias and myalgias. Left ankle pain   Skin: Negative for color change and rash. Neurological: Negative for dizziness and light-headedness. Psychiatric/Behavioral: Negative for agitation. All other systems reviewed and are negative. Positives and Pertinent negatives as per HPI. Except as noted above in the ROS, all other systems were reviewed and negative. PAST MEDICAL HISTORY     Past Medical History:   Diagnosis Date    Hypertension     Gestational HTN with 1st pregnancy    Thyroid disease     thyroid underactive prior to pregnancy but labs normal during pregnancy         SURGICAL HISTORY       Past Surgical History:   Procedure Laterality Date     SECTION N/A 2020     SECTION performed by Carmine Iglesias DO at [facility] L[facility]D [address]       Discharge Medication List as of 2022  9:08 PM      CONTINUE these medications which have NOT CHANGED    Details   ibuprofen (ADVIL;MOTRIN) 800 MG tablet Take 1 tablet by mouth every 8 hours, Disp-30 tablet,R-1Print      Prenatal Vit-Fe Fumarate-FA (PRENATAL VITAMIN PO) Take by mouthHistorical Med               ALLERGIES     Patient has no known allergies. FAMILY HISTORY     History reviewed. No pertinent family history. SOCIAL HISTORY       Social History     Socioeconomic History    Marital status:      Spouse name: None    Number of children: None    Years of education: None    Highest education level: None   Occupational History    None   Tobacco Use    Smoking status: Never Smoker    Smokeless tobacco: Never Used   Vaping Use    Vaping Use: Never used   Substance and Sexual Activity    Alcohol use: Never    Drug use: Never    Sexual activity: Yes     Partners: Male   Other Topics Concern    None   Social History Narrative    None     Social Determinants of Health     Financial Resource Strain:     Difficulty of Paying Living Expenses: Not on file   Food Insecurity:     Worried About Running Out of Food in the Last Year: Not on file    Saravanan of Food in the Last Year: Not on file   Transportation Needs:     Lack of Transportation (Medical): Not on file    Lack of Transportation (Non-Medical):  Not on file   Physical Activity:     Days of Exercise per Week: Not on file    Minutes of Exercise per Session: Not on file   Stress:     Feeling of Stress : Not on file   Social Connections:     Frequency of Communication with Friends and Family: Not on file    Frequency of Social Gatherings with Friends and Family: Not on file    Attends Temple Services: Not on file    Active Member of Clubs or Organizations: Not on file    Attends Club or Organization Meetings: Not on file    Marital Status: Not on file   Intimate Partner Violence:     Fear of Current or Ex-Partner: Not on file    Emotionally Abused: Not on file    Physically Abused: Not on file    Sexually Abused: Not on file   Housing Stability:     Unable to Pay for Housing in the Last Year: Not on file    Number of [place] in the Last Year: Not on file    Unstable Housing in the Last Year: Not on file       SCREENINGS    Harvey Coma Scale  Eye Opening: Spontaneous  Best Verbal Response: Oriented  Best Motor Response: Obeys commands  Harvey Coma Scale Score: 15        PHYSICAL EXAM  (up to 7 for level 4, 8 or more for level 5)     ED Triage Vitals   BP Temp Temp src Pulse Resp SpO2 Height Weight   -- -- -- -- -- -- -- --       Physical Exam  Constitutional:       Appearance: She is well-developed. She is obese. HENT:      Head: Normocephalic and atraumatic. Cardiovascular:      Rate and Rhythm: Normal rate. Pulmonary:      Effort: Pulmonary effort is normal. No respiratory distress. Abdominal:      General: There is no distension. Palpations: Abdomen is soft. Musculoskeletal:         General: Swelling and tenderness present. No deformity or signs of injury. Cervical back: Normal range of motion. Comments: Decreased range of motion to left ankle due to pain elicited with movement. Tenderness with palpation to the lateral malleolus. Sensation and pulse intact left foot. Skin:     General: Skin is warm and dry. Neurological:      Mental Status: She is alert and oriented to person, place, and time.          DIAGNOSTIC RESULTS   LABS:    Labs Reviewed - No data to display    All other labs werewithin normal range or not returned as of this dictation. EKG: All EKG's are interpreted by the Emergency Department Physician who either signs or Co-signs this chart in the absence of a cardiologist.  Please see their note for interpretation of EKG. RADIOLOGY:     Left ankle x-ray interpreted by radiologist for    FINDINGS:   The soft tissues are unremarkable. Prakash Spatz is no fracture or dislocation. No   focal destructive osseous lesion.  No radiopaque foreign body is identified. Interpretation per the Radiologist below, if available at the time of this note:    XR ANKLE LEFT (MIN 3 VIEWS)   Final Result   No acute abnormality           No results found. PROCEDURES   Unless otherwise noted below, none     Procedures     CRITICAL CARE TIME   N/A    CONSULTS:  None      EMERGENCYDEPARTMENT COURSE and DIFFERENTIAL DIAGNOSIS/MDM:   Vitals:    Vitals:    04/24/22 1949 04/24/22 1952 04/24/22 2118   BP: (!) 168/90  (!) 160/78   Pulse:  97 89   Resp: 18  18   Temp: 98.4 °F (36.9 °C)     TempSrc: Oral     SpO2: 100%  99%   Weight: 240 lb (108.9 kg)     Height: 5' 4\" (1.626 m)         Patient was given the following medications:  Medications   naproxen (NAPROSYN) tablet 250 mg (250 mg Oral Given 4/24/22 1955)   Patient was seen and evaluated by myself. Patient here for left ankle pain. Patient reports that he fell on her left ankle a few days ago and she has had pain ever since. Patient states that she is been using her medications with no relief. She denies any other injuries. On exam she is awake and alert hemodynamically stable nontoxic in appearance. She is neurologically intact her left foot. Left ankle x-ray was obtained. Patient was provided naproxen. Patient was provided with an Aircast and crutches. She was given a prescription for Naprosyn. She was given orthopedic physical therapy and primary care referrals and encouraged to follow-up.   She was also encouraged to return to the ED for worsening symptoms. Patient was ultimately discharged with all questions answered. The patient tolerated their visit well. I have evaluated this patient. My supervising physician was available for consultation. The patient and / or the family were informed of the results of any tests, a time was given to answer questions, a plan was proposed and they agreed with plan. FINAL IMPRESSION      1. Sprain of left ankle, unspecified ligament, initial encounter          DISPOSITION/PLAN   DISPOSITION        PATIENT REFERRED TO:  [facility]) Pre-Services  [phone]  Schedule an appointment as soon as possible for a visit in 1 week  to establish primary care    Roberto Darleen  [address] [facility]  Schedule an appointment as soon as possible for a visit in 3 days  for re-evaluation    [place] [id number]    [phone]  Call   to be re evaluated.     [address]  [address] [facility]  [phone]    If symptoms worsen      DISCHARGE MEDICATIONS:  Discharge Medication List as of 4/24/2022  9:08 PM      START taking these medications    Details   naproxen (NAPROSYN) 250 MG tablet Take 1 tablet by mouth 2 times daily (with meals), Disp-20 tablet, R-1Print             DISCONTINUED MEDICATIONS:  Discharge Medication List as of 4/24/2022  9:08 PM                 (Please note that portions of this note were completed with a voice recognition program.  Efforts were made to edit the dictations but occasionally words are mis-transcribed.)    JESUS Bautista CNP (electronically signed)         JESUS Bautista CNP  04/24/22 2039       JESUS Bautista CNP  04/27/22 1933

## 2022-07-11 ENCOUNTER — HOSPITAL ENCOUNTER (EMERGENCY)
Age: 27
Discharge: HOME | End: 2022-07-11
Payer: MEDICAID

## 2022-07-11 VITALS
DIASTOLIC BLOOD PRESSURE: 85 MMHG | HEART RATE: 95 BPM | HEIGHT: 64 IN | WEIGHT: 240 LBS | RESPIRATION RATE: 16 BRPM | OXYGEN SATURATION: 99 % | BODY MASS INDEX: 40.97 KG/M2 | SYSTOLIC BLOOD PRESSURE: 169 MMHG | TEMPERATURE: 97.9 F

## 2022-07-11 DIAGNOSIS — N93.9 VAGINAL BLEEDING: Primary | ICD-10-CM

## 2022-07-11 LAB
A/G RATIO: 1.8 (ref 1.1–2.2)
ABO/RH: NORMAL
ALBUMIN SERPL-MCNC: 4.9 G/DL (ref 3.4–5)
ALP BLD-CCNC: 80 U/L (ref 40–129)
ALT SERPL-CCNC: 41 U/L (ref 10–40)
ANION GAP SERPL CALCULATED.3IONS-SCNC: 16 MMOL/L (ref 3–16)
AST SERPL-CCNC: 34 U/L (ref 15–37)
BASOPHILS ABSOLUTE: 0.1 K/UL (ref 0–0.2)
BASOPHILS RELATIVE PERCENT: 0.8 %
BILIRUB SERPL-MCNC: 0.3 MG/DL (ref 0–1)
BILIRUBIN URINE: NEGATIVE
BLOOD, URINE: ABNORMAL
BUN BLDV-MCNC: 10 MG/DL (ref 7–20)
CALCIUM SERPL-MCNC: 10.3 MG/DL (ref 8.3–10.6)
CHLORIDE BLD-SCNC: 101 MMOL/L (ref 99–110)
CLARITY: CLEAR
CO2: 20 MMOL/L (ref 21–32)
COLOR: YELLOW
CREAT SERPL-MCNC: <0.5 MG/DL (ref 0.6–1.1)
EOSINOPHILS ABSOLUTE: 0.2 K/UL (ref 0–0.6)
EOSINOPHILS RELATIVE PERCENT: 1.5 %
EPITHELIAL CELLS, UA: ABNORMAL /HPF (ref 0–5)
GFR AFRICAN AMERICAN: >60
GFR NON-AFRICAN AMERICAN: >60
GLUCOSE BLD-MCNC: 106 MG/DL (ref 70–99)
GLUCOSE URINE: NEGATIVE MG/DL
GONADOTROPIN, CHORIONIC (HCG) QUANT: <5 MIU/ML
HCT VFR BLD CALC: 38.3 % (ref 36–48)
HEMOGLOBIN: 12.2 G/DL (ref 12–16)
INR BLD: 1.06 (ref 0.87–1.14)
KETONES, URINE: NEGATIVE MG/DL
LEUKOCYTE ESTERASE, URINE: NEGATIVE
LYMPHOCYTES ABSOLUTE: 3.6 K/UL (ref 1–5.1)
LYMPHOCYTES RELATIVE PERCENT: 27.5 %
MCH RBC QN AUTO: 23.9 PG (ref 26–34)
MCHC RBC AUTO-ENTMCNC: 31.9 G/DL (ref 31–36)
MCV RBC AUTO: 75 FL (ref 80–100)
MICROSCOPIC EXAMINATION: YES
MONOCYTES ABSOLUTE: 0.8 K/UL (ref 0–1.3)
MONOCYTES RELATIVE PERCENT: 6.3 %
NEUTROPHILS ABSOLUTE: 8.3 K/UL (ref 1.7–7.7)
NEUTROPHILS RELATIVE PERCENT: 63.9 %
NITRITE, URINE: NEGATIVE
PDW BLD-RTO: 15.1 % (ref 12.4–15.4)
PH UA: 6 (ref 5–8)
PLATELET # BLD: 345 K/UL (ref 135–450)
PMV BLD AUTO: 8.5 FL (ref 5–10.5)
POTASSIUM SERPL-SCNC: 4 MMOL/L (ref 3.5–5.1)
PROTEIN UA: ABNORMAL MG/DL
PROTHROMBIN TIME: 13.6 SEC (ref 11.7–14.5)
RBC # BLD: 5.11 M/UL (ref 4–5.2)
RBC UA: ABNORMAL /HPF (ref 0–4)
SODIUM BLD-SCNC: 137 MMOL/L (ref 136–145)
SPECIFIC GRAVITY UA: 1.01 (ref 1–1.03)
TOTAL PROTEIN: 7.7 G/DL (ref 6.4–8.2)
URINE TYPE: ABNORMAL
UROBILINOGEN, URINE: 0.2 E.U./DL
WBC # BLD: 13 K/UL (ref 4–11)
WBC UA: ABNORMAL /HPF (ref 0–5)

## 2022-07-11 PROCEDURE — 86900 BLOOD TYPING SEROLOGIC ABO: CPT

## 2022-07-11 PROCEDURE — 86901 BLOOD TYPING SEROLOGIC RH(D): CPT

## 2022-07-11 PROCEDURE — 81001 URINALYSIS AUTO W/SCOPE: CPT

## 2022-07-11 PROCEDURE — 80053 COMPREHEN METABOLIC PANEL: CPT

## 2022-07-11 PROCEDURE — 99283 EMERGENCY DEPT VISIT LOW MDM: CPT

## 2022-07-11 PROCEDURE — 85025 COMPLETE CBC W/AUTO DIFF WBC: CPT

## 2022-07-11 PROCEDURE — 85610 PROTHROMBIN TIME: CPT

## 2022-07-11 PROCEDURE — 84702 CHORIONIC GONADOTROPIN TEST: CPT

## 2022-07-11 ASSESSMENT — PAIN - FUNCTIONAL ASSESSMENT: PAIN_FUNCTIONAL_ASSESSMENT: NONE - DENIES PAIN

## 2022-07-12 NOTE — ED PROVIDER NOTES
Determinants of Health     Financial Resource Strain:     Difficulty of Paying Living Expenses: Not on file   Food Insecurity:     Worried About Running Out of Food in the Last Year: Not on file    Saravanan of Food in the Last Year: Not on file   Transportation Needs:     Lack of Transportation (Medical): Not on file    Lack of Transportation (Non-Medical): Not on file   Physical Activity:     Days of Exercise per Week: Not on file    Minutes of Exercise per Session: Not on file   Stress:     Feeling of Stress : Not on file   Social Connections:     Frequency of Communication with Friends and Family: Not on file    Frequency of Social Gatherings with Friends and Family: Not on file    Attends Confucianism Services: Not on file    Active Member of [address] [place] [address] [employer] or Organizations: Not on file    Attends Club or Organization Meetings: Not on file    Marital Status: Not on file   Intimate Partner Violence:     Fear of Current or Ex-Partner: Not on file    Emotionally Abused: Not on file    Physically Abused: Not on file    Sexually Abused: Not on file   Housing Stability:     Unable to Pay for Housing in the Last Year: Not on file    Number of [place] in the Last Year: Not on file    Unstable Housing in the Last Year: Not on file     No current facility-administered medications for this encounter.      Current Outpatient Medications   Medication Sig Dispense Refill    naproxen (NAPROSYN) 250 MG tablet Take 1 tablet by mouth 2 times daily (with meals) 20 tablet 1    ibuprofen (ADVIL;MOTRIN) 800 MG tablet Take 1 tablet by mouth every 8 hours 30 tablet 1    Prenatal Vit-Fe Fumarate-FA (PRENATAL VITAMIN PO) Take by mouth       No Known Allergies    REVIEW OF SYSTEMS  10 systems reviewed, pertinent positives per HPI otherwise noted to be negative    PHYSICAL EXAM  BP (!) 182/79   Pulse 100   Temp 97.9 °F (36.6 °C) (Axillary)   Resp 19   Ht 5' 4\" (1.626 m)   Wt 240 lb (108.9 kg)   LMP 06/12/2022   SpO2 98%   BMI 41.20 kg/m²   GENERAL APPEARANCE: Awake and alert. Cooperative. No acute distress. HEAD: Normocephalic. Atraumatic. EYES: PERRL. EOM's grossly intact. ENT: Mucous membranes are moist.   NECK: Supple. HEART: RRR. No murmurs. LUNGS: Respirations unlabored. CTAB. Good air exchange. Speaking comfortably in full sentences. ABDOMEN: Soft. Non-distended. Non-tender. No guarding or rebound. Negative Gallegos's, McBurney's and Rovsing's. No fluid waves or ascites. No hernias or masses. Bowel sounds normal quadrants. No CVA tenderness. EXTREMITIES: No peripheral edema. No unilateral calf pain, redness or swelling. Moves all extremities equally. All extremities neurovascularly intact. SKIN: Warm and dry. No acute rashes. NEUROLOGICAL: Alert and oriented. CN's 2-12 intact. No gross facial drooping. Strength 5/5, sensation intact. PSYCHIATRIC: Normal mood and affect. ED COURSE  Pain control was not required while here in the emergency department. Triage vitals stable. CBC mild leukocytosis 13.0. CMP unremarkable. Urinalysis large blood. No evidence for infectious process. INR 1.06. Patient found to be a positive. Quantitative hCG less than 5. Findings this time appear consistent with initiation of normal menstrual cycle. Discussed potential for having a false positive pregnancy test yesterday. Will be discharged with return precautions and recommendations for follow-up and patient is in agreement and comfortable at discharge. A discussion was had with Ms. lisa regarding positive home pregnancy test, vaginal bleeding, ED findings and recommendations for follow-up. Risk management discussed and shared decision making had with patient and/or surrogate. All questions were answered. Patient will follow up with OB/GYN in 2 to 3 days for further evaluation/treatment. All questions answered. Patient will return to ED for new/worsening symptoms.     No medications prescribed at Type NotGiven    hCG, quantitative, pregnancy   Result Value Ref Range    hCG Quant <5.0 <5.0 mIU/mL   Protime-INR   Result Value Ref Range    Protime 13.6 11.7 - 14.5 sec    INR 1.06 0.87 - 1.14   Microscopic Urinalysis   Result Value Ref Range    WBC, UA 3-5 0 - 5 /HPF    RBC, UA 21-50 (A) 0 - 4 /HPF    Epithelial Cells, UA 6-10 (A) 0 - 5 /HPF   ABO/RH   Result Value Ref Range    ABO/Rh A POS      I estimate there is LOW risk for ACUTE APPENDICITIS, BOWEL OBSTRUCTION, CHOLECYSTITIS, DIVERTICULITIS, INCARCERATED HERNIA, PANCREATITIS, PELVIC INFLAMMATORY DISEASE, PERFORATED BOWEL or ULCER, PREGNANCY, or TUBO-OVARIAN ABSCESS, thus I consider the discharge disposition reasonable. Also, there is no evidence or peritonitis, sepsis, or toxicity. Penny Melara and I have discussed the diagnosis and risks, and we agree with discharging home to follow-up with their primary doctor. We also discussed returning to the Emergency Department immediately if new or worsening symptoms occur. We have discussed the symptoms which are most concerning (e.g., bloody stool, fever, changing or worsening pain, vomiting) that necessitate immediate return. Final Impression  1. Vaginal bleeding      Blood pressure (!) 169/85, pulse 95, temperature 97.9 °F (36.6 °C), temperature source Axillary, resp. rate 16, height 5' 4\" (1.626 m), weight 240 lb (108.9 kg), last menstrual period 06/12/2022, SpO2 99 %, unknown if currently breastfeeding. DISPOSITION  Patient was discharged to home in good condition.           Jono Villatoro [place]  07/11/22 8899

## 2022-09-04 ENCOUNTER — APPOINTMENT (OUTPATIENT)
Dept: CT IMAGING | Age: 27
End: 2022-09-04
Payer: MEDICAID

## 2022-09-04 ENCOUNTER — HOSPITAL ENCOUNTER (EMERGENCY)
Age: 27
Discharge: HOME | End: 2022-09-04
Payer: MEDICAID

## 2022-09-04 VITALS
BODY MASS INDEX: 37.56 KG/M2 | RESPIRATION RATE: 16 BRPM | HEART RATE: 87 BPM | HEIGHT: 64 IN | DIASTOLIC BLOOD PRESSURE: 84 MMHG | WEIGHT: 220 LBS | TEMPERATURE: 97 F | SYSTOLIC BLOOD PRESSURE: 146 MMHG | OXYGEN SATURATION: 99 %

## 2022-09-04 DIAGNOSIS — N20.1 URETEROLITHIASIS: Primary | ICD-10-CM

## 2022-09-04 LAB
A/G RATIO: 1.5 (ref 1.1–2.2)
ALBUMIN SERPL-MCNC: 4.3 G/DL (ref 3.4–5)
ALP BLD-CCNC: 74 U/L (ref 40–129)
ALT SERPL-CCNC: 29 U/L (ref 10–40)
ANION GAP SERPL CALCULATED.3IONS-SCNC: 16 MMOL/L (ref 3–16)
AST SERPL-CCNC: 36 U/L (ref 15–37)
BACTERIA: ABNORMAL /HPF
BASOPHILS ABSOLUTE: 0.1 K/UL (ref 0–0.2)
BASOPHILS RELATIVE PERCENT: 0.8 %
BILIRUB SERPL-MCNC: <0.2 MG/DL (ref 0–1)
BILIRUBIN URINE: ABNORMAL
BLOOD, URINE: ABNORMAL
BUN BLDV-MCNC: 10 MG/DL (ref 7–20)
CALCIUM SERPL-MCNC: 9.2 MG/DL (ref 8.3–10.6)
CHLORIDE BLD-SCNC: 100 MMOL/L (ref 99–110)
CLARITY: ABNORMAL
CO2: 20 MMOL/L (ref 21–32)
COLOR: ABNORMAL
CREAT SERPL-MCNC: <0.5 MG/DL (ref 0.6–1.1)
EOSINOPHILS ABSOLUTE: 0.1 K/UL (ref 0–0.6)
EOSINOPHILS RELATIVE PERCENT: 1.2 %
EPITHELIAL CELLS, UA: ABNORMAL /HPF (ref 0–5)
GFR AFRICAN AMERICAN: >60
GFR NON-AFRICAN AMERICAN: >60
GLUCOSE BLD-MCNC: 141 MG/DL (ref 70–99)
GLUCOSE URINE: NEGATIVE MG/DL
HCG QUALITATIVE: NEGATIVE
HCT VFR BLD CALC: 36.4 % (ref 36–48)
HEMOGLOBIN: 12 G/DL (ref 12–16)
KETONES, URINE: NEGATIVE MG/DL
LEUKOCYTE ESTERASE, URINE: NEGATIVE
LIPASE: 35 U/L (ref 13–60)
LYMPHOCYTES ABSOLUTE: 2 K/UL (ref 1–5.1)
LYMPHOCYTES RELATIVE PERCENT: 19.4 %
MCH RBC QN AUTO: 24.7 PG (ref 26–34)
MCHC RBC AUTO-ENTMCNC: 33 G/DL (ref 31–36)
MCV RBC AUTO: 74.9 FL (ref 80–100)
MICROSCOPIC EXAMINATION: YES
MONOCYTES ABSOLUTE: 0.7 K/UL (ref 0–1.3)
MONOCYTES RELATIVE PERCENT: 6.3 %
NEUTROPHILS ABSOLUTE: 7.6 K/UL (ref 1.7–7.7)
NEUTROPHILS RELATIVE PERCENT: 72.3 %
NITRITE, URINE: NEGATIVE
PDW BLD-RTO: 15.4 % (ref 12.4–15.4)
PH UA: 6 (ref 5–8)
PLATELET # BLD: 285 K/UL (ref 135–450)
PMV BLD AUTO: 8.3 FL (ref 5–10.5)
POTASSIUM REFLEX MAGNESIUM: 4.7 MMOL/L (ref 3.5–5.1)
PROTEIN UA: 100 MG/DL
RBC # BLD: 4.86 M/UL (ref 4–5.2)
RBC UA: >100 /HPF (ref 0–4)
REASON FOR REJECTION: NORMAL
REJECTED TEST: NORMAL
SODIUM BLD-SCNC: 136 MMOL/L (ref 136–145)
SPECIFIC GRAVITY UA: >=1.03 (ref 1–1.03)
TOTAL PROTEIN: 7.2 G/DL (ref 6.4–8.2)
URINE REFLEX TO CULTURE: ABNORMAL
URINE TYPE: ABNORMAL
UROBILINOGEN, URINE: 0.2 E.U./DL
WBC # BLD: 10.5 K/UL (ref 4–11)
WBC UA: ABNORMAL /HPF (ref 0–5)

## 2022-09-04 PROCEDURE — 84703 CHORIONIC GONADOTROPIN ASSAY: CPT

## 2022-09-04 PROCEDURE — 96375 TX/PRO/DX INJ NEW DRUG ADDON: CPT

## 2022-09-04 PROCEDURE — 96374 THER/PROPH/DIAG INJ IV PUSH: CPT

## 2022-09-04 PROCEDURE — 74177 CT ABD & PELVIS W/CONTRAST: CPT

## 2022-09-04 PROCEDURE — 36415 COLL VENOUS BLD VENIPUNCTURE: CPT

## 2022-09-04 PROCEDURE — 99285 EMERGENCY DEPT VISIT HI MDM: CPT

## 2022-09-04 PROCEDURE — 83690 ASSAY OF LIPASE: CPT

## 2022-09-04 PROCEDURE — 6360000004 HC RX CONTRAST MEDICATION: Performed by: NURSE PRACTITIONER

## 2022-09-04 PROCEDURE — 80053 COMPREHEN METABOLIC PANEL: CPT

## 2022-09-04 PROCEDURE — 81001 URINALYSIS AUTO W/SCOPE: CPT

## 2022-09-04 PROCEDURE — 85025 COMPLETE CBC W/AUTO DIFF WBC: CPT

## 2022-09-04 PROCEDURE — 6360000002 HC RX W HCPCS: Performed by: NURSE PRACTITIONER

## 2022-09-04 RX ORDER — KETOROLAC TROMETHAMINE 30 MG/ML
15 INJECTION, SOLUTION INTRAMUSCULAR; INTRAVENOUS ONCE
Status: COMPLETED | OUTPATIENT
Start: 2022-09-04 | End: 2022-09-04

## 2022-09-04 RX ORDER — HYDROCODONE BITARTRATE AND ACETAMINOPHEN 5; 325 MG/1; MG/1
1 TABLET ORAL EVERY 6 HOURS PRN
Qty: 12 TABLET | Refills: 0 | Status: SHIPPED | OUTPATIENT
Start: 2022-09-04 | End: 2022-09-07

## 2022-09-04 RX ORDER — ONDANSETRON 2 MG/ML
4 INJECTION INTRAMUSCULAR; INTRAVENOUS ONCE
Status: COMPLETED | OUTPATIENT
Start: 2022-09-04 | End: 2022-09-04

## 2022-09-04 RX ORDER — ONDANSETRON 4 MG/1
4 TABLET, ORALLY DISINTEGRATING ORAL EVERY 8 HOURS PRN
Qty: 6 TABLET | Refills: 0 | Status: SHIPPED | OUTPATIENT
Start: 2022-09-04

## 2022-09-04 RX ADMIN — ONDANSETRON HYDROCHLORIDE 4 MG: 2 INJECTION, SOLUTION INTRAMUSCULAR; INTRAVENOUS at 18:55

## 2022-09-04 RX ADMIN — KETOROLAC TROMETHAMINE 15 MG: 30 INJECTION, SOLUTION INTRAMUSCULAR; INTRAVENOUS at 18:55

## 2022-09-04 RX ADMIN — IOPAMIDOL 75 ML: 755 INJECTION, SOLUTION INTRAVENOUS at 19:53

## 2022-09-04 ASSESSMENT — PAIN - FUNCTIONAL ASSESSMENT
PAIN_FUNCTIONAL_ASSESSMENT: 0-10

## 2022-09-04 ASSESSMENT — ENCOUNTER SYMPTOMS
EYE PAIN: 0
VOMITING: 1
BLOOD IN STOOL: 0
DIARRHEA: 0
COUGH: 0
BACK PAIN: 0
ABDOMINAL PAIN: 0
RHINORRHEA: 0
NAUSEA: 1
SHORTNESS OF BREATH: 0
SORE THROAT: 0

## 2022-09-04 ASSESSMENT — PAIN DESCRIPTION - LOCATION
LOCATION: FLANK
LOCATION: FLANK

## 2022-09-04 ASSESSMENT — PAIN SCALES - GENERAL
PAINLEVEL_OUTOF10: 6
PAINLEVEL_OUTOF10: 2
PAINLEVEL_OUTOF10: 6
PAINLEVEL_OUTOF10: 2
PAINLEVEL_OUTOF10: 2

## 2022-09-04 ASSESSMENT — PAIN DESCRIPTION - ORIENTATION
ORIENTATION: RIGHT
ORIENTATION: RIGHT

## 2022-09-04 NOTE — ED PROVIDER NOTES
[address] ED  EMERGENCY DEPARTMENT ENCOUNTER        Pt Name: Denia Nichols  MRN: [id number]  [place] 1995  Date of evaluation: 9/4/2022  Provider: JESUS Watt CNP  PCP: No primary care provider on file. Note Started: 6:14 PM EDT      RIDGE. I have evaluated this patient. My supervising physician was available for consultation. Triage CHIEF COMPLAINT       Chief Complaint   Patient presents with    Flank Pain     C/o right side abd/back pain, n/v.          HISTORY OF PRESENT ILLNESS   (Location/Symptom, Timing/Onset, Context/Setting, Quality, Duration, Modifying Factors, Severity)  Note limiting factors. Chief Complaint: Right flank pain    Brennen Melara is a 23 y.o. female who presents to the emerge from symptoms of right flank pain. Patient reported symptoms of pain in the right flank times this morning. States that she having pain in the right flank without any symptoms of abdominal pain. No vaginal bleeding or discharge. No pelvic pain. States that she has pain located in the right flank but denies any other acute complaints. States that she did have an episode of vomiting x1 this morning but denies any other vomiting or GI related problems. States that she has had a symptom of pain with worsening range of motion and palpation of her back associated with the pain today. Patient states that after pushing on the area feels as though her symptoms may have improved. States that she does not believe she is pregnant. States that she has no other acute complaints at this time. Nursing Notes were all reviewed and agreed with or any disagreements were addressed in the HPI. REVIEW OF SYSTEMS    (2-9 systems for level 4, 10 or more for level 5)     Review of Systems   Constitutional:  Negative for chills, diaphoresis and fever. HENT:  Negative for congestion, ear pain, rhinorrhea and sore throat. Eyes:  Negative for pain and visual disturbance.    Respiratory: Negative for cough and shortness of breath. Cardiovascular:  Negative for chest pain. Gastrointestinal:  Positive for nausea and vomiting. Negative for abdominal pain, blood in stool and diarrhea. Genitourinary:  Positive for flank pain. Negative for decreased urine volume, difficulty urinating, dysuria, frequency, pelvic pain, urgency, vaginal bleeding, vaginal discharge and vaginal pain. Musculoskeletal:  Negative for back pain and neck pain. Skin:  Negative for rash and wound. Neurological:  Negative for headaches. PAST MEDICAL HISTORY     Past Medical History:   Diagnosis Date    Hypertension     Gestational HTN with 1st pregnancy    Thyroid disease     thyroid underactive prior to pregnancy but labs normal during pregnancy       SURGICAL HISTORY     Past Surgical History:   Procedure Laterality Date     SECTION N/A 2020     SECTION performed by Michelle Arechiga DO at [facility] [address] [facility]       Discharge Medication List as of 2022  9:09 PM        CONTINUE these medications which have NOT CHANGED    Details   naproxen (NAPROSYN) 250 MG tablet Take 1 tablet by mouth 2 times daily (with meals), Disp-20 tablet, R-1Print      ibuprofen (ADVIL;MOTRIN) 800 MG tablet Take 1 tablet by mouth every 8 hours, Disp-30 tablet,R-1Print      Prenatal Vit-Fe Fumarate-FA (PRENATAL VITAMIN PO) Take by mouthHistorical Med             ALLERGIES     Patient has no known allergies. FAMILYHISTORY     History reviewed. No pertinent family history.      SOCIAL HISTORY       Social History     Socioeconomic History    Marital status:      Spouse name: None    Number of children: None    Years of education: None    Highest education level: None   Tobacco Use    Smoking status: Never    Smokeless tobacco: Never   Vaping Use    Vaping Use: Never used   Substance and Sexual Activity    Alcohol use: Never    Drug use: Never    Sexual activity: Yes     Partners: Male SCREENINGS    Harvey Coma Scale  Eye Opening: Spontaneous  Best Verbal Response: Oriented  Best Motor Response: Obeys commands  Harvey Coma Scale Score: 15        PHYSICAL EXAM    (up to 7 for level 4, 8 or more for level 5)     ED Triage Vitals [09/04/22 1808]   BP Temp Temp Source Heart Rate Resp SpO2 Height Weight   (!) 169/102 97 °F (36.1 °C) Oral 99 16 99 % 5' 4\" (1.626 m) 220 lb (99.8 kg)       Physical Exam  Vitals and nursing note reviewed. Constitutional:       Appearance: Normal appearance. She is not toxic-appearing or diaphoretic. HENT:      Head: Normocephalic and atraumatic. Nose: Nose normal.   Eyes:      General:         Right eye: No discharge. Left eye: No discharge. Cardiovascular:      Rate and Rhythm: Normal rate and regular rhythm. Pulses: Normal pulses. Heart sounds: No murmur heard. Pulmonary:      Effort: Pulmonary effort is normal. No respiratory distress. Breath sounds: No wheezing or rhonchi. Abdominal:      Palpations: Abdomen is soft. Tenderness: There is no abdominal tenderness. There is no guarding or rebound. Musculoskeletal:         General: Normal range of motion. Cervical back: Normal range of motion and neck supple. Right lower leg: No edema. Left lower leg: No edema. Skin:     General: Skin is warm and dry. Capillary Refill: Capillary refill takes less than 2 seconds. Neurological:      General: No focal deficit present. Mental Status: She is alert and oriented to person, place, and time.    Psychiatric:         Mood and Affect: Mood normal.         Behavior: Behavior normal.       DIAGNOSTIC RESULTS   LABS:    Labs Reviewed   COMPREHENSIVE METABOLIC PANEL W/ REFLEX TO MG FOR LOW K - Abnormal; Notable for the following components:       Result Value    CO2 20 (*)     Glucose 141 (*)     Creatinine <0.5 (*)     All other components within normal limits   URINALYSIS WITH REFLEX TO CULTURE - Abnormal; Notable for the following components:    Color, UA BROWN (*)     Clarity, UA SL CLOUDY (*)     Bilirubin Urine SMALL (*)     Blood, Urine LARGE (*)     Protein,  (*)     All other components within normal limits   CBC WITH AUTO DIFFERENTIAL - Abnormal; Notable for the following components:    MCV 74.9 (*)     MCH 24.7 (*)     All other components within normal limits   MICROSCOPIC URINALYSIS - Abnormal; Notable for the following components:    RBC, UA >100 (*)     Epithelial Cells, UA 6-10 (*)     Bacteria, UA 2+ (*)     All other components within normal limits   HCG, SERUM, QUALITATIVE   LIPASE   SPECIMEN REJECTION       When ordered, only abnormal lab results are displayed. All other labs were within normal range or not returned as of this dictation. EKG: When ordered, EKG's are interpreted by the Emergency Department Physician in the absence of a cardiologist.  Please see their note for interpretation of EKG. RADIOLOGY:   Non-plain film images such as CT, Ultrasound and MRI are read by the radiologist. Plain radiographic images are visualized andpreliminarily interpreted by the  ED Provider with the below findings:        Interpretation perthe Radiologist below, if available at the time of this note:    CT ABDOMEN PELVIS W IV CONTRAST Additional Contrast? None   Final Result   There is  moderate right-sided hydronephrosis noted. There is a small stone   seen at the right UPJ, measuring 2 mm. Stones remain in the right kidney,   measuring 3 mm in size or less. Normal caliber appendix           No results found.       PROCEDURES   Unless otherwise noted below, none     Procedures    CRITICAL CARE TIME   N/A    CONSULTS:  None      EMERGENCY DEPARTMENT COURSE and DIFFERENTIAL DIAGNOSIS/MDM:   Vitals:    Vitals:    09/04/22 1808 09/04/22 2115   BP: (!) 169/102 (!) 146/84   Pulse: 99 87   Resp: 16 16   Temp: 97 °F (36.1 °C)    TempSrc: Oral    SpO2: 99% 99%   Weight: 220 lb (99.8 kg)    Height: 5' 4\" (1.626 m)        Patient was given thefollowing medications:  Medications   ketorolac (TORADOL) injection 15 mg (15 mg IntraVENous Given 9/4/22 1855)   ondansetron (ZOFRAN) injection 4 mg (4 mg IntraVENous Given 9/4/22 1855)   iopamidol (ISOVUE-370) 76 % injection 75 mL (75 mLs IntraVENous Given 9/4/22 1953)         Is this patient to be included in the SEP-1 Core Measure due to severe sepsis or septic shock? No   Exclusion criteria - the patient is NOT to be included for SEP-1 Core Measure due to: Infection is not suspected    Patient is noted to have a right ureteral stone. We will go ahead and treat with Norco for pain control as well as some Zofran for nausea control. Strainer will be provided for the patient. Outpatient follow-up with urology. Patient displays no sign of infection. No fever. Urine displays no sign of infection mild hematuria which is consistent with ureteral stone and ureteral trauma. No other acute complaints. I am the Primary Clinician of Record. FINAL IMPRESSION      1.  Ureterolithiasis          DISPOSITION/PLAN   DISPOSITION Decision To Discharge 09/04/2022 09:17:37 PM      PATIENT REFERREDTO:  Naman Perez MD  [address] [facility] [facility] Final [address] [facility] [address] [facility]  [phone]    Schedule an appointment as soon as possible for a visit       [facility]YSICAL [facility] ED  [address] [facility] [address]  Go to   If symptoms worsen    [facility]) Pre-Services  [phone]  Schedule an appointment as soon as possible for a visit       DISCHARGE MEDICATIONS:  Discharge Medication List as of 9/4/2022  9:09 PM          DISCONTINUED MEDICATIONS:  Discharge Medication List as of 9/4/2022  9:09 PM                 (Please note that portions ofthis note were completed with a voice recognition program.  Efforts were made to edit the dictations but occasionally words are mis-transcribed.)    JESUS Hickman CNP (electronically signed)             Suhail Ramos [place] - CNP  09/05/22 6951

## 2025-01-10 ENCOUNTER — TRANSCRIBE ORDERS (OUTPATIENT)
Dept: ADMINISTRATIVE | Age: 30
End: 2025-01-10

## 2025-01-10 DIAGNOSIS — N63.21 MASS OF UPPER OUTER QUADRANT OF LEFT BREAST: Primary | ICD-10-CM

## 2025-07-15 LAB — GONADOTROPIN, CHORIONIC (HCG) QUANT: 5171.5 MIU/ML

## 2025-07-21 LAB — GONADOTROPIN, CHORIONIC (HCG) QUANT: 69.6 MIU/ML

## 2025-07-28 LAB — GONADOTROPIN, CHORIONIC (HCG) QUANT: 6.7 MIU/ML

## 2025-08-04 LAB — GONADOTROPIN, CHORIONIC (HCG) QUANT: <5 MIU/ML

## (undated) DEVICE — GLOVE SURG SZ 6 THK91MIL LTX FREE SYN POLYISOPRENE ANTI

## (undated) DEVICE — S/USE RESUS KIT W/O MASK (10): Brand: FISHER & PAYKEL HEALTHCARE

## (undated) DEVICE — 3M™ STERI-STRIP™ COMPOUND BENZOIN TINCTURE 40 BAGS/CARTON 4 CARTONS/CASE C1544: Brand: 3M™ STERI-STRIP™

## (undated) DEVICE — DRESSING COMP IS W4XL10IN PD W2XL8IN CNTCT LAYR ADH

## (undated) DEVICE — CHLORAPREP 26ML ORANGE

## (undated) DEVICE — Device

## (undated) DEVICE — SUTURE VCRL SZ 0 L36IN ABSRB UD L36MM CT-1 1/2 CIR J946H

## (undated) DEVICE — SOLUTION IV IRRIG POUR BRL 0.9% SODIUM CHL 2F7124

## (undated) DEVICE — SUTURE ABSORBABLE BRAIDED 2-0 CT-1 27 IN UD VICRYL J259H

## (undated) DEVICE — PAD,NON-ADHERENT,3X8,STERILE,LF,1/PK: Brand: MEDLINE

## (undated) DEVICE — GARMENT COMPR L FOR 23IN CALF FLOTRN

## (undated) DEVICE — SUTURE VCRL SZ 0 L36IN ABSRB UD L48MM CTX 1/2 CIR J978H

## (undated) DEVICE — TRAY URIN CATH 16FR DRNGE BG STATLOK STBL DEV F SURSTP

## (undated) DEVICE — SUTURE VCRL SZ 3-0 L36IN ABSRB UD L36MM CT-1 1/2 CIR J944H